# Patient Record
Sex: FEMALE | Race: ASIAN | NOT HISPANIC OR LATINO
[De-identification: names, ages, dates, MRNs, and addresses within clinical notes are randomized per-mention and may not be internally consistent; named-entity substitution may affect disease eponyms.]

---

## 2023-04-11 PROBLEM — Z00.00 ENCOUNTER FOR PREVENTIVE HEALTH EXAMINATION: Status: ACTIVE | Noted: 2023-04-11

## 2023-04-30 ENCOUNTER — FORM ENCOUNTER (OUTPATIENT)
Age: 58
End: 2023-04-30

## 2023-05-02 ENCOUNTER — APPOINTMENT (OUTPATIENT)
Dept: HEPATOLOGY | Facility: CLINIC | Age: 58
End: 2023-05-02
Payer: MEDICAID

## 2023-05-02 VITALS
BODY MASS INDEX: 19.56 KG/M2 | SYSTOLIC BLOOD PRESSURE: 106 MMHG | HEART RATE: 54 BPM | HEIGHT: 64.57 IN | OXYGEN SATURATION: 99 % | DIASTOLIC BLOOD PRESSURE: 71 MMHG | WEIGHT: 116 LBS

## 2023-05-02 DIAGNOSIS — J45.909 UNSPECIFIED ASTHMA, UNCOMPLICATED: ICD-10-CM

## 2023-05-02 PROCEDURE — 99214 OFFICE O/P EST MOD 30 MIN: CPT

## 2023-05-02 PROCEDURE — 76981 USE PARENCHYMA: CPT

## 2023-05-02 PROCEDURE — 99204 OFFICE O/P NEW MOD 45 MIN: CPT

## 2023-05-02 RX ORDER — BUTYROSPERMUM PARKII(SHEA BUTTER), SIMMONDSIA CHINENSIS (JOJOBA) SEED OIL, ALOE BARBADENSIS LEAF EXTRACT .01; 1; 3.5 G/100G; G/100G; G/100G
100 LIQUID TOPICAL
Refills: 0 | Status: ACTIVE | COMMUNITY
Start: 2023-05-02

## 2023-05-02 RX ORDER — TENOFOVIR DISOPROXIL FUMARATE 300 MG/1
300 TABLET ORAL
Refills: 0 | Status: ACTIVE | COMMUNITY
Start: 2023-05-02

## 2023-05-02 RX ORDER — DIAPER,BRIEF,INFANT-TODD,DISP
250-5 EACH MISCELLANEOUS
Refills: 0 | Status: ACTIVE | COMMUNITY
Start: 2023-05-02

## 2023-05-02 RX ORDER — VITAMIN B COMPLEX
CAPSULE ORAL
Refills: 0 | Status: ACTIVE | COMMUNITY
Start: 2023-05-02

## 2023-05-02 RX ORDER — MONTELUKAST 10 MG/1
10 TABLET, FILM COATED ORAL
Refills: 0 | Status: ACTIVE | COMMUNITY
Start: 2023-05-02

## 2023-05-02 RX ORDER — ALENDRONATE SODIUM 70 MG/1
70 TABLET ORAL
Refills: 0 | Status: ACTIVE | COMMUNITY
Start: 2023-05-02

## 2023-05-03 ENCOUNTER — OUTPATIENT (OUTPATIENT)
Dept: OUTPATIENT SERVICES | Facility: HOSPITAL | Age: 58
LOS: 1 days | End: 2023-05-03

## 2023-05-03 ENCOUNTER — APPOINTMENT (OUTPATIENT)
Dept: ULTRASOUND IMAGING | Facility: CLINIC | Age: 58
End: 2023-05-03
Payer: MEDICAID

## 2023-05-03 PROCEDURE — 76700 US EXAM ABDOM COMPLETE: CPT | Mod: 26

## 2023-05-04 ENCOUNTER — NON-APPOINTMENT (OUTPATIENT)
Age: 58
End: 2023-05-04

## 2023-05-05 ENCOUNTER — NON-APPOINTMENT (OUTPATIENT)
Age: 58
End: 2023-05-05

## 2023-05-18 NOTE — REVIEW OF SYSTEMS
[Fever] : no fever [Chills] : no chills [Feeling Poorly] : not feeling poorly [Feeling Tired] : not feeling tired [Nosebleeds] : no nosebleeds [Nasal Discharge] : no nasal discharge [Sore Throat] : no sore throat [Hoarseness] : no hoarseness [Chest Pain] : no chest pain [Palpitations] : no palpitations [Leg Claudication] : no intermittent leg claudication [Lower Ext Edema] : no lower extremity edema [Shortness Of Breath] : no shortness of breath [Wheezing] : no wheezing [Cough] : no cough [SOB on Exertion] : no shortness of breath during exertion [Abdominal Pain] : no abdominal pain [Vomiting] : no vomiting [Constipation] : no constipation [Diarrhea] : no diarrhea [Melena] : no melena [Itching] : no itching [Confused] : no confusion [Dizziness] : no dizziness

## 2023-05-18 NOTE — HISTORY OF PRESENT ILLNESS
[FreeTextEntry1] : \par PCP: Dr. Clyde Mccall  FAX [phone]  TEL [phone]\par GI: Dr. Coronado\par \par \par 59 y/o F with chronic hep B, on TDF. Pt reports she moved to [place] for work but she comes back every 6-12 months to see doctors. Today pt reports she had labs done with PCP yesterday. \par \par Today pt reports feeling well. Denies fever, chills, n/v/d/c ,SOB, CP, HA, dizziness, abd pain, jaundice. \par \par \par \par Medical Hx \par Surgical Hx fibroid removal 2009\par Social Hx \par Tobacco: Never \par Alcohol: no\par illicit drug: no \par Herb and dietary Supplement: no \par FMH of liver disease: Father, sister, and brother with hep B. Father and brother with cirrhosis. Brother with HCC. \par \par \par  \par \par \par Procedures:\par Colonoscopy 2017: no problem as per pt. \par EGD 2017: no problem. \par

## 2023-05-18 NOTE — ASSESSMENT
[FreeTextEntry1] : \par \par 57 y/o F with chronic hep B, on TDF. Pt reports she moved to [place] for work but she comes back every 6-12 months to see doctors. Today pt reports she had labs done with PCP yesterday. \par \par Today pt reports feeling well. Denies fever, chills, n/v/d/c ,SOB, CP, HA, dizziness, abd pain, jaundice. \par \par Fibroscan today: 3.4kPa 194 CAP (F0-F1, S0)\par \par HBV: Natural course of HBV discussed with pt. Reviewed fibroscan today-  (F0-F1, S0). Will get lab results from PCP. Fibroscan every 2 years. Advised to cont to be compliant on TDF. Pt reports she is getting TDF from PCP. Informed pt that if she wants, we can prescribe the TDF also. \par \par HCC Screening: Abd US q6m. Abd US this week. \par \par RTC 6 months. \par

## 2023-05-18 NOTE — PHYSICAL EXAM
[Non-Tender] : non-tender [General Appearance - Alert] : alert [General Appearance - In No Acute Distress] : in no acute distress [General Appearance - Well Nourished] : well nourished [General Appearance - Well Developed] : well developed [General Appearance - Well-Appearing] : healthy appearing [Sclera] : the sclera and conjunctiva were normal [Neck Appearance] : the appearance of the neck was normal [Neck Cervical Mass (___cm)] : no neck mass was observed [] : no respiratory distress [Exaggerated Use Of Accessory Muscles For Inspiration] : no accessory muscle use [Edema] : there was no peripheral edema [Veins - Varicosity Changes] : there were no varicosital changes [Abdomen Soft] : soft [Abdomen Tenderness] : non-tender [Abdomen Hernia] : no hernia was discovered [Oriented To Time, Place, And Person] : oriented to person, place, and time [Scleral Icterus] : No Scleral Icterus [Abdominal  Ascites] : no ascites [Asterixis] : no asterixis observed [Jaundice] : No jaundice

## 2023-11-08 ENCOUNTER — APPOINTMENT (OUTPATIENT)
Dept: HEPATOLOGY | Facility: CLINIC | Age: 58
End: 2023-11-08
Payer: MEDICAID

## 2023-11-08 VITALS
SYSTOLIC BLOOD PRESSURE: 119 MMHG | DIASTOLIC BLOOD PRESSURE: 79 MMHG | BODY MASS INDEX: 20.07 KG/M2 | OXYGEN SATURATION: 97 % | RESPIRATION RATE: 18 BRPM | WEIGHT: 119 LBS | HEART RATE: 69 BPM | TEMPERATURE: 97.6 F

## 2023-11-08 DIAGNOSIS — B18.1 CHRONIC VIRAL HEPATITIS B W/OUT DELTA-AGENT: ICD-10-CM

## 2023-11-08 PROCEDURE — 99213 OFFICE O/P EST LOW 20 MIN: CPT

## 2023-11-08 RX ORDER — VITAMIN E (DL,TOCOPHERYL ACET) 400 UNIT
400 TABLET,CHEWABLE ORAL
Refills: 0 | Status: ACTIVE | COMMUNITY
Start: 2023-11-08

## 2023-11-08 RX ORDER — UBIDECARENONE 400 MG
400 CAPSULE ORAL
Refills: 0 | Status: ACTIVE | COMMUNITY
Start: 2023-11-08

## 2023-11-09 ENCOUNTER — NON-APPOINTMENT (OUTPATIENT)
Age: 58
End: 2023-11-09

## 2023-11-09 LAB
AFP-TM SERPL-MCNC: 3 NG/ML
ALBUMIN SERPL ELPH-MCNC: 4.5 G/DL
ALP BLD-CCNC: 36 U/L
ALT SERPL-CCNC: 18 U/L
ANION GAP SERPL CALC-SCNC: 11 MMOL/L
AST SERPL-CCNC: 22 U/L
BASOPHILS # BLD AUTO: 0.05 K/UL
BASOPHILS NFR BLD AUTO: 0.9 %
BILIRUB SERPL-MCNC: 0.4 MG/DL
BUN SERPL-MCNC: 13 MG/DL
CALCIUM SERPL-MCNC: 9.3 MG/DL
CHLORIDE SERPL-SCNC: 106 MMOL/L
CO2 SERPL-SCNC: 25 MMOL/L
CREAT SERPL-MCNC: 0.65 MG/DL
EGFR: 101 ML/MIN/1.73M2
EOSINOPHIL # BLD AUTO: 0.08 K/UL
EOSINOPHIL NFR BLD AUTO: 1.5 %
GLUCOSE SERPL-MCNC: 93 MG/DL
HBV E AB SER QL: REACTIVE
HBV E AG SER QL: NONREACTIVE
HCT VFR BLD CALC: 38.5 %
HEPB DNA PCR INT: NOT DETECTED
HEPB DNA PCR LOG: NOT DETECTED LOGIU/ML
HGB BLD-MCNC: 12.6 G/DL
IMM GRANULOCYTES NFR BLD AUTO: 0.2 %
LYMPHOCYTES # BLD AUTO: 2.33 K/UL
LYMPHOCYTES NFR BLD AUTO: 42.8 %
MAN DIFF?: NORMAL
MCHC RBC-ENTMCNC: 29.9 PG
MCHC RBC-ENTMCNC: 32.7 GM/DL
MCV RBC AUTO: 91.2 FL
MONOCYTES # BLD AUTO: 0.3 K/UL
MONOCYTES NFR BLD AUTO: 5.5 %
NEUTROPHILS # BLD AUTO: 2.68 K/UL
NEUTROPHILS NFR BLD AUTO: 49.1 %
PLATELET # BLD AUTO: 240 K/UL
POTASSIUM SERPL-SCNC: 4.7 MMOL/L
PROT SERPL-MCNC: 7.2 G/DL
RBC # BLD: 4.22 M/UL
RBC # FLD: 12.8 %
SODIUM SERPL-SCNC: 142 MMOL/L
WBC # FLD AUTO: 5.45 K/UL

## 2024-05-06 ENCOUNTER — TRANSCRIPTION ENCOUNTER (OUTPATIENT)
Age: 59
End: 2024-05-06

## 2024-07-31 ENCOUNTER — APPOINTMENT (OUTPATIENT)
Dept: HEPATOLOGY | Facility: CLINIC | Age: 59
End: 2024-07-31

## 2024-07-31 VITALS
SYSTOLIC BLOOD PRESSURE: 131 MMHG | BODY MASS INDEX: 20.58 KG/M2 | RESPIRATION RATE: 18 BRPM | WEIGHT: 122 LBS | HEART RATE: 71 BPM | DIASTOLIC BLOOD PRESSURE: 88 MMHG | TEMPERATURE: 97.7 F | OXYGEN SATURATION: 98 %

## 2024-07-31 DIAGNOSIS — B18.1 CHRONIC VIRAL HEPATITIS B W/OUT DELTA-AGENT: ICD-10-CM

## 2024-07-31 PROCEDURE — 99214 OFFICE O/P EST MOD 30 MIN: CPT

## 2024-07-31 NOTE — HISTORY OF PRESENT ILLNESS
[FreeTextEntry1] : PCP: Dr. Clyde Mccall FAX [phone] TEL [phone] GI: Dr. Coronado  57 y/o F with chronic hep B, on TDF for many years, returns for follow up. Reports compliance with hep B medication. Currently feeling well and has no complaints. She lives in [place] and returns to [place] once every 6-12 months to see doctors. Denies nausea, vomiting, diarrhea, abd pain, or jaundice.  Meds: magnesium, TDF, montelukast, vitamin D, calcium  Surgical Hx fibroid removal 2009 Social Hx Tobacco: Never Alcohol: no illicit drug: no Herb and dietary Supplement: no FMH of liver disease: Father, sister, and brother with hep B. Father and brother with cirrhosis. Brother with HCC.  Imaging: Abd US 5/3/23:  Liver: Normal echogenicity. Liver size is normal, measuring 13.6 cm. No focal lesions. Limited Doppler evaluation demonstrates patency of the portal and hepatic vein vasculature.  Procedures: Fibroscan 5/2/23: F0-F1, S0 Colonoscopy 2017: no problem as per pt. EGD 2017: no problem.

## 2024-07-31 NOTE — HISTORY OF PRESENT ILLNESS
May 18, 2022      [facility]  [address] [address]  Phone: [phone]  Fax: [phone]       Patient: Twan Alejandre   YOB: 2021  Date of Visit: 05/18/2022    To Whom It May Concern:    Maddison Alejandre  was at [facility] on 05/18/2022. The patient may return to work/school on 5/18 with no restrictions. If you have any questions or concerns, or if I can be of further assistance, please do not hesitate to contact me.    Sincerely,    Aym Clemens RD      [FreeTextEntry1] : PCP: Dr. Clyde Mccall FAX [phone] TEL [phone] GI: Dr. Coronado  57 y/o F with chronic hep B, on TDF for many years, returns for follow up. Reports compliance with hep B medication. Currently feeling well and has no complaints. She lives in [place] and returns to [place] once every 6-12 months to see doctors. Denies nausea, vomiting, diarrhea, abd pain, or jaundice.  Meds: magnesium, TDF, montelukast, vitamin D, calcium  Surgical Hx fibroid removal 2009 Social Hx Tobacco: Never Alcohol: no illicit drug: no Herb and dietary Supplement: no FMH of liver disease: Father, sister, and brother with hep B. Father and brother with cirrhosis. Brother with HCC.  Imaging: Abd US 5/3/23:  Liver: Normal echogenicity. Liver size is normal, measuring 13.6 cm. No focal lesions. Limited Doppler evaluation demonstrates patency of the portal and hepatic vein vasculature.  Procedures: Fibroscan 5/2/23: F0-F1, S0 Colonoscopy 2017: no problem as per pt. EGD 2017: no problem.

## 2024-07-31 NOTE — ASSESSMENT
[FreeTextEntry1] : 57 y/o F with chronic hep B, on TDF for many years, with good compliance.   Fibroscan 5/2/23: 3.4kPa 194 CAP (F0-F1, S0)  #Chronic hepatitis B We have discussed the natural history of the disease and the potential benefits of continued antiviral treatment for viral suppression for reducing risks of cirrhosis and hepatocellular carcinoma (HCC). We have discussed the importance of continued 100% medication adherence without gaps in treatment and that discontinuation of treatment carries a risk of HBV reactivation/flare that could potentially be life-threatening. - Continue tenofovir (filled by PCP). - Labs today - HCC surveillance: AFP and US every 6 months. Last abd USG from 5/2023 with normal findings. Repeat abd USG.  Will call patient with results.   RTC in 6-12 months because pt states she is in [place] and it is difficult to come back.

## 2024-07-31 NOTE — REVIEW OF SYSTEMS
[Negative] : Heme/Lymph [Fever] : no fever [Chills] : no chills [Feeling Poorly] : not feeling poorly [Feeling Tired] : not feeling tired [Nosebleeds] : no nosebleeds [Nasal Discharge] : no nasal discharge [Sore Throat] : no sore throat [Hoarseness] : no hoarseness [Chest Pain] : no chest pain [Palpitations] : no palpitations [Leg Claudication] : no intermittent leg claudication [Lower Ext Edema] : no lower extremity edema [Shortness Of Breath] : no shortness of breath [Wheezing] : no wheezing [Cough] : no cough [SOB on Exertion] : no shortness of breath during exertion [Abdominal Pain] : no abdominal pain [Vomiting] : no vomiting [Constipation] : no constipation [Diarrhea] : no diarrhea [Melena] : no melena [Itching] : no itching

## 2024-07-31 NOTE — PHYSICAL EXAM
[Non-Tender] : non-tender [General Appearance - Alert] : alert [General Appearance - In No Acute Distress] : in no acute distress [General Appearance - Well Nourished] : well nourished [General Appearance - Well Developed] : well developed [Sclera] : the sclera and conjunctiva were normal [Neck Appearance] : the appearance of the neck was normal [] : no respiratory distress [Exaggerated Use Of Accessory Muscles For Inspiration] : no accessory muscle use [Edema] : there was no peripheral edema [Abdomen Soft] : soft [Abdomen Tenderness] : non-tender [Abdomen Mass (___ Cm)] : no abdominal mass palpated [Oriented To Time, Place, And Person] : oriented to person, place, and time [Auscultation Breath Sounds / Voice Sounds] : lungs were clear to auscultation bilaterally [Heart Rate And Rhythm] : heart rate was normal and rhythm regular [Heart Sounds] : normal S1 and S2 [Murmurs] : no murmurs [Abnormal Walk] : normal gait [Skin Color & Pigmentation] : normal skin color and pigmentation [Scleral Icterus] : No Scleral Icterus [Abdominal  Ascites] : no ascites [Asterixis] : no asterixis observed [Jaundice] : No jaundice

## 2024-08-02 LAB
ALBUMIN SERPL ELPH-MCNC: 4.7 G/DL
ALP BLD-CCNC: 43 U/L
ALT SERPL-CCNC: 16 U/L
ANION GAP SERPL CALC-SCNC: 11 MMOL/L
AST SERPL-CCNC: 17 U/L
BILIRUB SERPL-MCNC: 0.3 MG/DL
BUN SERPL-MCNC: 13 MG/DL
CALCIUM SERPL-MCNC: 9.8 MG/DL
CHLORIDE SERPL-SCNC: 106 MMOL/L
CO2 SERPL-SCNC: 24 MMOL/L
CREAT SERPL-MCNC: 0.68 MG/DL
EGFR: 99 ML/MIN/1.73M2
GLUCOSE SERPL-MCNC: 91 MG/DL
HBV CORE IGG+IGM SER QL: REACTIVE
HBV CORE IGM SER QL: NONREACTIVE
HBV E AB SER QL: REACTIVE
HBV E AG SER QL: NONREACTIVE
HBV SURFACE AB SERPL IA-ACNC: <3 MIU/ML
HBV SURFACE AG SER QL: REACTIVE
HCT VFR BLD CALC: 42 %
HEPB DNA PCR INT: NOT DETECTED
HEPB DNA PCR LOG: NOT DETECTED LOGIU/ML
HGB BLD-MCNC: 13.2 G/DL
MCHC RBC-ENTMCNC: 29.5 PG
MCHC RBC-ENTMCNC: 31.4 GM/DL
MCV RBC AUTO: 93.8 FL
PLATELET # BLD AUTO: 263 K/UL
POTASSIUM SERPL-SCNC: 4.1 MMOL/L
PROT SERPL-MCNC: 7.5 G/DL
RBC # BLD: 4.48 M/UL
RBC # FLD: 13.1 %
SODIUM SERPL-SCNC: 142 MMOL/L
WBC # FLD AUTO: 6.05 K/UL

## 2024-08-06 LAB
ALPHA-1-FETOPROTEIN-L3: NORMAL %
ALPHA-1-FETOPROTEIN: 3.4 NG/ML

## 2024-08-07 LAB
HDV AB SER IA-ACNC: NEGATIVE
HEPATITIS D VIRUS IGM AB: NORMAL